# Patient Record
Sex: MALE | Race: BLACK OR AFRICAN AMERICAN | NOT HISPANIC OR LATINO | Employment: UNEMPLOYED | URBAN - METROPOLITAN AREA
[De-identification: names, ages, dates, MRNs, and addresses within clinical notes are randomized per-mention and may not be internally consistent; named-entity substitution may affect disease eponyms.]

---

## 2020-06-29 ENCOUNTER — HOSPITAL ENCOUNTER (EMERGENCY)
Facility: HOSPITAL | Age: 25
Discharge: HOME | End: 2020-06-29

## 2020-06-29 VITALS
DIASTOLIC BLOOD PRESSURE: 83 MMHG | TEMPERATURE: 98 F | HEART RATE: 94 BPM | RESPIRATION RATE: 16 BRPM | WEIGHT: 162 LBS | BODY MASS INDEX: 23.19 KG/M2 | HEIGHT: 70 IN | SYSTOLIC BLOOD PRESSURE: 125 MMHG | OXYGEN SATURATION: 99 %

## 2020-06-29 DIAGNOSIS — N45.1 EPIDIDYMITIS: ICD-10-CM

## 2020-06-29 DIAGNOSIS — Z20.2 POSSIBLE EXPOSURE TO STD: Primary | ICD-10-CM

## 2020-06-29 DIAGNOSIS — N50.89 TESTICULAR SWELLING: ICD-10-CM

## 2020-06-29 DIAGNOSIS — R10.9 FLANK PAIN: ICD-10-CM

## 2020-06-29 DIAGNOSIS — R60.9 SWELLING: ICD-10-CM

## 2020-06-29 LAB
BACTERIA #/AREA URNS HPF: ABNORMAL /HPF
BILIRUB UR QL STRIP: ABNORMAL
CLARITY UR: CLEAR
COLOR UR: YELLOW
GLUCOSE UR QL STRIP: NEGATIVE
HGB UR QL STRIP: NEGATIVE
HYALINE CASTS #/AREA URNS LPF: 0 /LPF
KETONES UR QL STRIP: ABNORMAL
LEUKOCYTE ESTERASE UR QL STRIP: ABNORMAL
MICROSCOPIC COMMENT: ABNORMAL
NITRITE UR QL STRIP: NEGATIVE
PH UR STRIP: 7 [PH] (ref 5–8)
PROT UR QL STRIP: ABNORMAL
RBC #/AREA URNS HPF: 4 /HPF (ref 0–4)
SP GR UR STRIP: 1.02 (ref 1–1.03)
URN SPEC COLLECT METH UR: ABNORMAL
UROBILINOGEN UR STRIP-ACNC: >=8 EU/DL
WBC #/AREA URNS HPF: 75 /HPF (ref 0–5)

## 2020-06-29 PROCEDURE — 96372 THER/PROPH/DIAG INJ SC/IM: CPT

## 2020-06-29 PROCEDURE — 87086 URINE CULTURE/COLONY COUNT: CPT

## 2020-06-29 PROCEDURE — 76870 US EXAM SCROTUM: CPT | Mod: TC

## 2020-06-29 PROCEDURE — 76870 US EXAM SCROTUM: CPT | Mod: 26,,, | Performed by: RADIOLOGY

## 2020-06-29 PROCEDURE — 99284 EMERGENCY DEPT VISIT MOD MDM: CPT | Mod: 25

## 2020-06-29 PROCEDURE — 74176 CT RENAL STONE STUDY ABD PELVIS WO: ICD-10-PCS | Mod: 26,,, | Performed by: RADIOLOGY

## 2020-06-29 PROCEDURE — 74176 CT ABD & PELVIS W/O CONTRAST: CPT | Mod: 26,,, | Performed by: RADIOLOGY

## 2020-06-29 PROCEDURE — 81000 URINALYSIS NONAUTO W/SCOPE: CPT

## 2020-06-29 PROCEDURE — 76870 US SCROTUM AND TESTICLES: ICD-10-PCS | Mod: 26,,, | Performed by: RADIOLOGY

## 2020-06-29 PROCEDURE — 74176 CT ABD & PELVIS W/O CONTRAST: CPT | Mod: TC

## 2020-06-29 PROCEDURE — 87491 CHLMYD TRACH DNA AMP PROBE: CPT

## 2020-06-29 PROCEDURE — 63600175 PHARM REV CODE 636 W HCPCS: Performed by: NURSE PRACTITIONER

## 2020-06-29 RX ORDER — DOXYCYCLINE 100 MG/1
100 CAPSULE ORAL 2 TIMES DAILY
Qty: 20 CAPSULE | Refills: 0 | Status: SHIPPED | OUTPATIENT
Start: 2020-06-29 | End: 2020-07-09

## 2020-06-29 RX ORDER — KETOROLAC TROMETHAMINE 30 MG/ML
30 INJECTION, SOLUTION INTRAMUSCULAR; INTRAVENOUS
Status: COMPLETED | OUTPATIENT
Start: 2020-06-29 | End: 2020-06-29

## 2020-06-29 RX ORDER — CEFTRIAXONE 1 G/1
250 INJECTION, POWDER, FOR SOLUTION INTRAMUSCULAR; INTRAVENOUS
Status: COMPLETED | OUTPATIENT
Start: 2020-06-29 | End: 2020-06-29

## 2020-06-29 RX ADMIN — CEFTRIAXONE SODIUM 250 MG: 1 INJECTION, POWDER, FOR SOLUTION INTRAMUSCULAR; INTRAVENOUS at 04:06

## 2020-06-29 RX ADMIN — KETOROLAC TROMETHAMINE 30 MG: 30 INJECTION, SOLUTION INTRAMUSCULAR at 02:06

## 2020-06-29 NOTE — ED PROVIDER NOTES
Encounter Date: 6/29/2020       History     Chief Complaint   Patient presents with    Exposure to STD     patient complaining of hematuria and pain after having unprotected sex 1 week ago.     Jonathan Ellison is a 24 year old male with no sign past medical history. He presents to ED with complaint of hematuria, right flank pain and testicular swelling    Patient reports that he started with hematuria 4 days ago. He started with intermittent right flank pain yesterday. He describes pain as sharp and stabbing in nature.     Patient also reports swelling and tenderness to testicle x 2 days. No penile discharge. No difficulty urinating.    No abdominal pain. No nausea, vomiting or diarrhea.    Patient concerned that he may have an STD. Symptoms started a few days after having unprotected sex.    No fever, chills, chills, chest pain, dyspnea, weakness, dizziness, syncope or vomiting            Review of patient's allergies indicates:  No Known Allergies  History reviewed. No pertinent past medical history.  History reviewed. No pertinent surgical history.  History reviewed. No pertinent family history.  Social History     Tobacco Use    Smoking status: Current Every Day Smoker   Substance Use Topics    Alcohol use: Yes     Comment: occ    Drug use: Yes     Types: Marijuana     Review of Systems   Constitutional: Negative.  Negative for fever.   HENT: Negative.  Negative for sore throat.    Eyes: Negative.    Respiratory: Negative.  Negative for shortness of breath.    Cardiovascular: Negative.  Negative for chest pain.   Gastrointestinal: Negative.  Negative for nausea.   Endocrine: Negative.    Genitourinary: Positive for flank pain, hematuria and testicular pain. Negative for decreased urine volume, difficulty urinating, discharge, dysuria, enuresis, frequency, genital sores, penile pain, penile swelling, scrotal swelling and urgency.   Musculoskeletal: Negative for back pain.   Skin: Negative.  Negative for  rash.   Allergic/Immunologic: Negative.    Neurological: Negative.  Negative for weakness.   Hematological: Negative.  Does not bruise/bleed easily.   Psychiatric/Behavioral: Negative.    All other systems reviewed and are negative.      Physical Exam     Initial Vitals [06/29/20 1328]   BP Pulse Resp Temp SpO2   127/82 99 20 98.1 °F (36.7 °C) 100 %      MAP       --         Physical Exam    Nursing note and vitals reviewed.  Constitutional: Vital signs are normal. He appears well-developed and well-nourished. He is not diaphoretic. No distress.   Neck: Normal range of motion. Neck supple.   Cardiovascular: Normal rate.   Pulmonary/Chest: Breath sounds normal.   Abdominal: Soft. Normal appearance and bowel sounds are normal.   Genitourinary:    Penis normal.   Right testis shows swelling and tenderness. Left testis shows no mass, no swelling and no tenderness. Left testis is descended. Cremasteric reflex is not absent on the left side. No phimosis, paraphimosis, hypospadias, penile erythema or penile tenderness. No discharge found.   Musculoskeletal: Normal range of motion.   Neurological: He is alert and oriented to person, place, and time. GCS score is 15. GCS eye subscore is 4. GCS verbal subscore is 5. GCS motor subscore is 6.   Skin: Skin is warm. Capillary refill takes less than 2 seconds.   Psychiatric: He has a normal mood and affect. His behavior is normal. Judgment and thought content normal.         ED Course   Procedures  Labs Reviewed   URINALYSIS, REFLEX TO URINE CULTURE - Abnormal; Notable for the following components:       Result Value    Protein, UA 1+ (*)     Ketones, UA Trace (*)     Bilirubin (UA) 1+ (*)     Urobilinogen, UA >=8.0 (*)     Leukocytes, UA 2+ (*)     All other components within normal limits    Narrative:     Preferred Collection Type->Urine, Clean Catch  Specimen Source->Urine   URINALYSIS MICROSCOPIC - Abnormal; Notable for the following components:    WBC, UA 75 (*)     All  other components within normal limits    Narrative:     Preferred Collection Type->Urine, Clean Catch  Specimen Source->Urine   C. TRACHOMATIS/N. GONORRHOEAE BY AMP DNA   CULTURE, URINE          Imaging Results           US Scrotum And Testicles (Final result)  Result time 06/29/20 16:15:03    Final result by Freddy Christianson MD (06/29/20 16:15:03)                 Impression:      1. Findings consistent with right orchitis and epididymitis.  2. Small bilateral hydroceles.  3. Right varicocele.  This report was flagged in Epic as abnormal.      Electronically signed by: Freddy Christianson  Date:    06/29/2020  Time:    16:15             Narrative:    EXAMINATION:  US SCROTUM AND TESTICLES    CLINICAL HISTORY:  Edema, unspecified    TECHNIQUE:  Sonography of the scrotum and testes.    COMPARISON:  CT same day.    FINDINGS:  The right testicle is normal in size demonstrating mild edematous change measuring 3.8 x 3.1 x 3.3 cm.  The right testicle demonstrates mild increased blood flow by color Doppler.  The left testicle is normal in size and echogenicity demonstrating normal blood flow by color Doppler measuring 3.8 x 2.6 x 2.9 cm.    The right epididymis is prominent size demonstrating increased blood flow by color Doppler.  The left epididymis is normal in size and echogenicity with normal blood flow by color Doppler.    There are small bilateral hydroceles.  There is a small right hydrocele.  No left hydrocele is identified.                               CT Renal Stone Study ABD Pelvis WO (Final result)  Result time 06/29/20 14:59:26    Final result by Freddy Christianson MD (06/29/20 14:59:26)                 Impression:      1. Small nonobstructing right renal calculus.  2. Large amount of stool throughout the colon and rectum without plain film evidence for bowel obstruction.      Electronically signed by: Freddy Christianson  Date:    06/29/2020  Time:    14:59             Narrative:    EXAMINATION:  CT RENAL STONE  STUDY ABD PELVIS WO    CLINICAL HISTORY:  Hematuria, renal cause suspected;    TECHNIQUE:  Low dose axial images, sagittal and coronal reformations were obtained from the lung bases to the pubic symphysis.  Contrast was not administered.    COMPARISON:  None    FINDINGS:  The lung bases are clear.  No pleural or pericardial effusions.    The liver and spleen are normal in size and attenuation.  The gallbladder, pancreas and adrenal glands are unremarkable.    Kidneys are normal in size and attenuation.  There is a small 3 mm nonobstructing right renal calculus.  No left renal calculi.  No changes of hydronephrosis.  No perinephric inflammatory change.    There is a large amount of stool throughout the colon and rectum.  No mesenteric inflammatory change.  No CT evidence for bowel obstruction.  The appendix is not definitely identified.  No pericecal inflammatory changes to suggest an acute appendicitis.    The bladder is decompressed.  Prostate, seminal vesicles and rectum unremarkable.    No significant mesenteric or retroperitoneal lymphadenopathy.                                 Medical Decision Making:   Initial Assessment:   Patient with complaint of hematuria, right flank pain and testicular swelling    Patient reports that he started with hematuria 4 days ago. He started with intermittent right flank pain yesterday. He describes pain as sharp and stabbing in nature.     Patient also reports swelling and tenderness to testicle x 2 days. No penile discharge. No difficulty urinating.    Patient concerned that he may have an STD. Symptoms started a few days after having unprotected sex.    No fever, chills, chills, chest pain, dyspnea, weakness, dizziness, syncope or vomiting      Differential Diagnosis:   UTI, STD, epididymis, mass    Kidney stone   ED Management:  CT with small nonobstructing right renal calculus.    US with right orchitis and epididymitis, small bilateral hydroceles and right  varicocele.    Urine micro with 75 WBC    Discussed physical exam findings with patient  No acute emergent medical condition identified at this time to warrant further testing/diagnostics  At this time, I believe the patient is clinically stable for discharge.   Patient to follow up with PCP in 1-2 days.  The patient acknowledges that close follow up with a MD is required after all ER visits  Pt given instructions; take all medications prescribed in the ER as directed.   Patient agrees to comply with all instruction and direction given in the ER  Pt agrees to return to ER if any symptoms reoccur                                          Clinical Impression:       ICD-10-CM ICD-9-CM   1. Possible exposure to STD  Z20.2 V01.6   2. Swelling  R60.9 782.3   3. Epididymitis  N45.1 604.90   4. Testicular swelling  N50.89 608.86   5. Flank pain  R10.9 789.09                                Melani Caldwell NP  06/29/20 [id number]

## 2020-06-29 NOTE — DISCHARGE INSTRUCTIONS
Take medication as prescribed    Refrain from sexual activity for 10 days and no symptoms    Return to ED if symptoms worsen

## 2020-06-30 LAB
BACTERIA UR CULT: NO GROWTH
C TRACH DNA SPEC QL NAA+PROBE: DETECTED
N GONORRHOEA DNA SPEC QL NAA+PROBE: DETECTED

## 2021-09-13 ENCOUNTER — HOSPITAL ENCOUNTER (EMERGENCY)
Facility: HOSPITAL | Age: 26
Discharge: LEFT BEFORE DISCHARGE | End: 2021-09-13
Attending: FAMILY MEDICINE

## 2021-09-13 VITALS
BODY MASS INDEX: 21.47 KG/M2 | HEIGHT: 70 IN | OXYGEN SATURATION: 100 % | HEART RATE: 94 BPM | WEIGHT: 150 LBS | RESPIRATION RATE: 18 BRPM | TEMPERATURE: 99 F | SYSTOLIC BLOOD PRESSURE: 112 MMHG | DIASTOLIC BLOOD PRESSURE: 67 MMHG

## 2021-09-13 DIAGNOSIS — T14.90XA TRAUMA: ICD-10-CM

## 2021-09-13 DIAGNOSIS — S51.811A LACERATION OF RIGHT FOREARM, INITIAL ENCOUNTER: Primary | ICD-10-CM

## 2021-09-13 PROCEDURE — 90471 IMMUNIZATION ADMIN: CPT | Performed by: NURSE PRACTITIONER

## 2021-09-13 PROCEDURE — 99284 EMERGENCY DEPT VISIT MOD MDM: CPT | Mod: 25

## 2021-09-13 PROCEDURE — 63600175 PHARM REV CODE 636 W HCPCS: Performed by: NURSE PRACTITIONER

## 2021-09-13 PROCEDURE — 73090 XR FOREARM RIGHT: ICD-10-PCS | Mod: 26,RT,, | Performed by: RADIOLOGY

## 2021-09-13 PROCEDURE — 96372 THER/PROPH/DIAG INJ SC/IM: CPT

## 2021-09-13 PROCEDURE — 90714 TD VACC NO PRESV 7 YRS+ IM: CPT | Performed by: NURSE PRACTITIONER

## 2021-09-13 PROCEDURE — 73090 X-RAY EXAM OF FOREARM: CPT | Mod: TC,FY,RT

## 2021-09-13 PROCEDURE — 73090 X-RAY EXAM OF FOREARM: CPT | Mod: 26,RT,, | Performed by: RADIOLOGY

## 2021-09-13 RX ORDER — CEFTRIAXONE 1 G/1
1 INJECTION, POWDER, FOR SOLUTION INTRAMUSCULAR; INTRAVENOUS
Status: COMPLETED | OUTPATIENT
Start: 2021-09-13 | End: 2021-09-13

## 2021-09-13 RX ADMIN — TETANUS AND DIPHTHERIA TOXOIDS ADSORBED 0.5 ML: 2; 2 INJECTION INTRAMUSCULAR at 08:09

## 2021-09-13 RX ADMIN — CEFTRIAXONE SODIUM 1 G: 1 INJECTION, POWDER, FOR SOLUTION INTRAMUSCULAR; INTRAVENOUS at 08:09
